# Patient Record
Sex: FEMALE | Race: WHITE | NOT HISPANIC OR LATINO | URBAN - NONMETROPOLITAN AREA
[De-identification: names, ages, dates, MRNs, and addresses within clinical notes are randomized per-mention and may not be internally consistent; named-entity substitution may affect disease eponyms.]

---

## 2020-09-30 ENCOUNTER — IMPORTED ENCOUNTER (OUTPATIENT)
Dept: URBAN - NONMETROPOLITAN AREA CLINIC 1 | Facility: CLINIC | Age: 80
End: 2020-09-30

## 2020-09-30 PROBLEM — H52.03: Noted: 2020-09-30

## 2020-09-30 PROBLEM — H52.4: Noted: 2020-09-30

## 2020-09-30 PROBLEM — H52.223: Noted: 2020-09-30

## 2020-09-30 PROBLEM — Z96.1: Noted: 2020-09-30

## 2020-09-30 PROBLEM — H43.813: Noted: 2020-12-28

## 2020-09-30 PROBLEM — H26.493: Noted: 2020-09-30

## 2020-09-30 PROBLEM — E11.3293: Noted: 2020-09-30

## 2020-09-30 PROCEDURE — 92004 COMPRE OPH EXAM NEW PT 1/>: CPT

## 2020-09-30 PROCEDURE — 92015 DETERMINE REFRACTIVE STATE: CPT

## 2020-09-30 NOTE — PATIENT DISCUSSION
Mixed Astigmatism OU w/Presbyopia-  discussed findings w/patient-  new spectacle Rx issued-  continue to monitor yearly or prnPseudophakia w/PCO OU-  discussed findings w/patient-  no treatment indicated at this time-  continue to monitor yearly or prnType 2 DM w/o Retinopathy OU-  discussed findings w/patient-  Stressed the importance of keeping blood sugars under control blood pressure under control and weight normalization and regular visits with PCP.-  Explained the possible effects of poorly controlled diabetes and the damage that diabetes can cause to ocular health. -  Pt instructed to contact our office with any vision changes. -  Continue to monitor yearly or prnPVD OU-  discussed findings w/patient-  Retina flat 360 with no breaks tears or heme. -  S&S of RD/RT reviewed with pt. -  Stressed that pt should contact our office right away with any changes or increase in symptoms.-  RTC 1 year or prn; 's Notes: MR 9/30/2020DFE 9/30/2020

## 2021-09-08 ENCOUNTER — IMPORTED ENCOUNTER (OUTPATIENT)
Dept: URBAN - NONMETROPOLITAN AREA CLINIC 1 | Facility: CLINIC | Age: 81
End: 2021-09-08

## 2021-09-08 PROBLEM — H52.4: Noted: 2021-09-08

## 2021-09-08 PROBLEM — Z96.1: Noted: 2021-09-13

## 2021-09-08 PROBLEM — H52.223: Noted: 2021-09-08

## 2021-09-08 PROBLEM — H35.373: Noted: 2021-09-13

## 2021-09-08 PROBLEM — H26.493: Noted: 2021-09-13

## 2021-09-08 PROBLEM — H43.813: Noted: 2021-09-13

## 2021-09-08 PROBLEM — E11.3293: Noted: 2021-09-13

## 2021-09-08 PROCEDURE — 92134 CPTRZ OPH DX IMG PST SGM RTA: CPT

## 2021-09-08 PROCEDURE — 92014 COMPRE OPH EXAM EST PT 1/>: CPT

## 2021-09-08 PROCEDURE — 92015 DETERMINE REFRACTIVE STATE: CPT

## 2021-09-08 NOTE — PATIENT DISCUSSION
ERM OU w/ Lamellar Hole OS - discussed findings w/patient - discussed signs and symptoms associated - OCT mac done today: ERM OU w/ Lamellar Hole OS - patient to call or come in ASAP if any changes in vision noted - continue to monitor Pseudophakia w/PCO OU-  discussed findings w/patient-  no treatment indicated at this time-  continue to monitor yearly or prnType 2 DM w/mild Retinopathy OU-  discussed findings w/patient-  few MA's noted OU-  Stressed the importance of keeping blood sugars under control blood pressure under control and weight normalization and regular visits with PCP.-  Explained the possible effects of poorly controlled diabetes and the damage that diabetes can cause to ocular health. -  Pt instructed to contact our office with any vision changes. -  Continue to monitor yearly or prnPVD OU-  discussed findings w/patient-  Retina flat 360 with no breaks tears or heme. -  S&S of RD/RT reviewed with pt. -  Stressed that pt should contact our office right away with any changes or increase in symptoms.-  RTC 1 year or prnMixed Astigmatism OU w/Presbyopia-  discussed findings w/patient-  new spectacle Rx issued-  continue to monitor yearly or prn; 's Notes: MR 9/8/2021DFE 9/8/2021OCT Mac 9/8/2021

## 2022-04-15 ASSESSMENT — VISUAL ACUITY
OS_SC: 20/30
OS_SC: 20/30+
OD_SC: 20/40-2

## 2022-04-15 ASSESSMENT — TONOMETRY
OS_IOP_MMHG: 14
OS_IOP_MMHG: 16
OD_IOP_MMHG: 16
OD_IOP_MMHG: 14